# Patient Record
Sex: MALE | Race: WHITE | NOT HISPANIC OR LATINO | URBAN - METROPOLITAN AREA
[De-identification: names, ages, dates, MRNs, and addresses within clinical notes are randomized per-mention and may not be internally consistent; named-entity substitution may affect disease eponyms.]

---
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
## 2022-02-12 VITALS
TEMPERATURE: 98.1 F | HEIGHT: 68 IN | SYSTOLIC BLOOD PRESSURE: 92 MMHG | DIASTOLIC BLOOD PRESSURE: 73 MMHG | BODY MASS INDEX: 24.25 KG/M2 | HEART RATE: 88 BPM | WEIGHT: 160 LBS

## 2022-02-16 NOTE — NURSING NOTE
Depression Screening Entered On:  4/16/2019 8:01 AM CDT    Performed On:  4/15/2019 8:01 AM CDT by Tressa Tejeda MA               Depression Screening   Little Interest - Pleasure in Activities :   More than half the days   Feeling Down, Depressed, Hopeless :   Several days   Initial Depression Screen Score :   3    Trouble Falling or Staying Asleep :   Nearly every day   Feeling Tired or Little Energy :   More than half the days   Poor Appetite or Overeating :   More than half the days   Feeling Bad About Yourself :   Not at all   Trouble Concentrating :   Several days   Moving or Speaking Slowly :   Not at all   Thoughts Better Off Dead or Hurting Self :   Not at all   Detailed Depression Screen Score :   8    Total Depression Screen Score :   11    CAREN Difficulty with Work, Home, Others :   Somewhat difficult   Tressa Tejeda MA - 4/16/2019 8:01 AM CDT

## 2022-02-16 NOTE — NURSING NOTE
Comprehensive Intake Entered On:  4/15/2019 3:34 PM CDT    Performed On:  4/15/2019 3:29 PM CDT by Crista Fernandez RN               Summary   Chief Complaint :   Here with c/o not feeling well for about 2 weeks.  Throat seems to have cleared on its own but now has nasal and head congestion with green nasal discharge especially in a.m. Denies fevers but has had sweats the past two nights.   Weight Measured :   160 lb(Converted to: 160 lb 0 oz, 72.57 kg)    Height Measured :   67.5 in(Converted to: 5 ft 7 in, 171.45 cm)    Body Mass Index :   24.69 kg/m2   Body Surface Area :   1.86 m2   Systolic Blood Pressure :   92 mmHg   Diastolic Blood Pressure :   73 mmHg   Mean Arterial Pressure :   79 mmHg   Peripheral Pulse Rate :   88 bpm   Temperature Temporal :   98.1 DegF(Converted to: 36.7 DegC)    Crista Fernandez RN - 4/15/2019 3:29 PM CDT   Health Status   Allergies Verified? :   Yes   Medication History Verified? :   Yes   Immunizations Current :   Unknown   Medical History Verified? :   No   Pre-Visit Planning Status :   Completed   Consents Current :   Yes   Planning a Pregnancy? :   No   Tobacco Use? :   Never smoker   Crista Fernanedz RN - 4/15/2019 3:29 PM CDT

## 2022-02-16 NOTE — NURSING NOTE
CAGE Assessment Entered On:  4/16/2019 8:01 AM CDT    Performed On:  4/15/2019 8:01 AM CDT by Tressa Tejeda MA               Assessment   Have you ever felt you should cut down on your drinking :   No   Have people annoyed you by criticizing your drinking :   No   Have you ever felt bad or guilty about your drinking :   No   Have you ever taken a drink first thing in the morning to steady your nerves or get rid of a hangover (Eye-opener) :   No   CAGE Score :   0    Tressa Tejeda MA - 4/16/2019 8:01 AM CDT

## 2022-02-16 NOTE — PROGRESS NOTES
Patient:   VIRGINIA VAUGHAN            MRN: [id number]            FIN: [id number]               Age:   18 years     Sex:  Male     :  2000   Associated Diagnoses:   Acute sinus infection   Author:   Rome Palacios MD      Chief Complaint   4/15/2019 3:29 PM CDT    Here with c/o not feeling well for about 2 weeks.  Throat seems to have cleared on its own but now has nasal and head congestion with green nasal discharge especially in a.m. Denies fevers but has had sweats the past two nights.        History of Present Illness   see chief complaint as noted above and confirmed with the patient   18 year old male presenting because he is not feeling well. Symptoms started 2 weeks ago with a sore throat that has cleared up on it's own but now he is having nasal and sinus congestion with green nasal discharge. He has had some headaches. Denies any fevers or chills.      Review of Systems   Constitutional:  Fatigue, No fever, No chills.    Ear/Nose/Mouth/Throat:  Nasal congestion, Sinus pain, Sore throat (resolved), No ear pain.    Respiratory:  No shortness of breath, No cough, No wheezing.    Cardiovascular:  No chest pain.    Gastrointestinal:  Negative.    Musculoskeletal:  No back pain.    Integumentary:  No rash.    Neurologic:  Headache.    Psychiatric:  Negative.              Health Status   Allergies:    Allergic Reactions (Selected)  No known allergies   Medications:  (Selected)      Problem list:    No problem items selected or recorded.      Histories   Past Medical History:    No active or resolved past medical history items have been selected or recorded.   Family History:    No family history items have been selected or recorded.   Procedure history:    No active procedure history items have been selected or recorded.   Social History:             No active social history items have been recorded.      Physical Examination   Vital Signs   4/15/2019 3:29 PM CDT Temperature Temporal 98.1 DegF    Peripheral  Pulse Rate 88 bpm    Systolic Blood Pressure 92 mmHg    Diastolic Blood Pressure 73 mmHg    Mean Arterial Pressure 79 mmHg      Measurements from flowsheet : Measurements   4/15/2019 3:29 PM CDT Height Measured - Standard 67.5 in    Weight Measured - Standard 160 lb    BSA 1.86 m2    Body Mass Index 24.69 kg/m2    Body Mass Index Percentile 74.92      General:  Alert and oriented, No acute distress.    Eye:  Pupils are equal, round and reactive to light, Normal conjunctiva.    HENT:  Oral mucosa is moist.    Neck:  Supple.    Respiratory:  Respirations are non-labored.    Cardiovascular:  Normal rate, Regular rhythm, No edema.    Gastrointestinal:  Non-distended.    Musculoskeletal:  Normal gait.    Integumentary:  Warm, No rash.    Psychiatric:  Cooperative, Appropriate mood & affect, Normal judgment.       Review / Management   Results review      Impression and Plan   Diagnosis     Acute sinus infection ([id number] J01.90).     Plan:  Will treat sinus infection with amoxicillin 875mg bid for 10 days.  Discussed steam, use of a neti pot and afrin may be helpful. Reviewed expected course, what to watch for, and when to return.   [facility], acted solely as a scribe for, and in the presence of Dr. Rome Palacios who performed the service..